# Patient Record
Sex: MALE | Race: BLACK OR AFRICAN AMERICAN | NOT HISPANIC OR LATINO | URBAN - METROPOLITAN AREA
[De-identification: names, ages, dates, MRNs, and addresses within clinical notes are randomized per-mention and may not be internally consistent; named-entity substitution may affect disease eponyms.]

---

## 2021-01-04 ENCOUNTER — EMERGENCY (EMERGENCY)
Facility: HOSPITAL | Age: 46
LOS: 1 days | Discharge: HOME | End: 2021-01-04
Attending: EMERGENCY MEDICINE
Payer: SELF-PAY

## 2021-01-04 VITALS
HEIGHT: 70 IN | WEIGHT: 184.97 LBS | TEMPERATURE: 99 F | OXYGEN SATURATION: 98 % | DIASTOLIC BLOOD PRESSURE: 86 MMHG | SYSTOLIC BLOOD PRESSURE: 169 MMHG | RESPIRATION RATE: 20 BRPM | HEART RATE: 49 BPM

## 2021-01-04 PROCEDURE — 99283 EMERGENCY DEPT VISIT LOW MDM: CPT

## 2021-01-04 PROCEDURE — 73130 X-RAY EXAM OF HAND: CPT | Mod: 26,RT

## 2021-01-04 PROCEDURE — 73130 X-RAY EXAM OF HAND: CPT

## 2021-01-04 RX ORDER — IBUPROFEN 200 MG
600 TABLET ORAL ONCE
Refills: 0 | Status: COMPLETED | OUTPATIENT
Start: 2021-01-04 | End: 2021-01-04

## 2021-01-04 RX ORDER — IBUPROFEN 200 MG
1 TABLET ORAL
Qty: 12 | Refills: 0
Start: 2021-01-04 | End: 2021-01-07

## 2021-01-04 RX ADMIN — Medication 600 MILLIGRAM(S): at 13:02

## 2021-01-04 NOTE — ED PROVIDER NOTE - OBJECTIVE STATEMENT
45 yr old M presented to ED with right hand pain s/p trauma . 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 Pt admits to pain in his right hand at the joint. Pt denies nay weakness, numbness or paraesthesia. Pt denies any other issues at this time.

## 2021-01-04 NOTE — ED PROVIDER NOTE - PATIENT PORTAL LINK FT
You can access the FollowMyHealth Patient Portal offered by [facility] by registering at the following website: [email or web address]. By joining [employer]’[facility] FollowMyHealth portal, you will also be able to view your health information using other applications (apps) compatible with our system.

## 2021-01-04 NOTE — ED PROVIDER NOTE - PHYSICAL EXAMINATION
Right Hand examination + Tenderness to 2nd MCP region. No weakness , No numbness or paraesthesia  No deformity  Normal sensation and Normal plses.

## 2021-01-04 NOTE — ED PROVIDER NOTE - CLINICAL SUMMARY MEDICAL DECISION MAKING FREE TEXT BOX
45 yr old M presented to ED with right hand pain s/p trauma . 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 Pt admits to pain in his right hand at the joint. Pt denies nay weakness, numbness or paraesthesia. Examination + Tenderness at 2nd MCP region , normal N/V findings. X-ray normal . Pt D/C in stable condition.